# Patient Record
Sex: FEMALE | Race: ASIAN | NOT HISPANIC OR LATINO
[De-identification: names, ages, dates, MRNs, and addresses within clinical notes are randomized per-mention and may not be internally consistent; named-entity substitution may affect disease eponyms.]

---

## 2018-05-01 PROBLEM — Z00.00 ENCOUNTER FOR PREVENTIVE HEALTH EXAMINATION: Status: ACTIVE | Noted: 2018-05-01

## 2018-05-16 ENCOUNTER — APPOINTMENT (OUTPATIENT)
Dept: ANTEPARTUM | Facility: CLINIC | Age: 38
End: 2018-05-16
Payer: MEDICAID

## 2018-05-16 ENCOUNTER — ASOB RESULT (OUTPATIENT)
Age: 38
End: 2018-05-16

## 2018-05-16 PROCEDURE — 36416 COLLJ CAPILLARY BLOOD SPEC: CPT

## 2018-05-16 PROCEDURE — 76813 OB US NUCHAL MEAS 1 GEST: CPT

## 2018-05-16 PROCEDURE — 76801 OB US < 14 WKS SINGLE FETUS: CPT

## 2018-07-05 ENCOUNTER — ASOB RESULT (OUTPATIENT)
Age: 38
End: 2018-07-05

## 2018-07-05 ENCOUNTER — APPOINTMENT (OUTPATIENT)
Dept: ANTEPARTUM | Facility: CLINIC | Age: 38
End: 2018-07-05
Payer: MEDICAID

## 2018-07-05 PROCEDURE — 76811 OB US DETAILED SNGL FETUS: CPT

## 2018-07-05 PROCEDURE — 99201 OFFICE OUTPATIENT NEW 10 MINUTES: CPT | Mod: 25

## 2018-08-28 ENCOUNTER — APPOINTMENT (OUTPATIENT)
Dept: MATERNAL FETAL MEDICINE | Facility: CLINIC | Age: 38
End: 2018-08-28
Payer: MEDICAID

## 2018-08-28 ENCOUNTER — ASOB RESULT (OUTPATIENT)
Age: 38
End: 2018-08-28

## 2018-08-28 DIAGNOSIS — O24.919 UNSPECIFIED DIABETES MELLITUS IN PREGNANCY, UNSPECIFIED TRIMESTER: ICD-10-CM

## 2018-08-28 PROCEDURE — G0109 DIAB MANAGE TRN IND/GROUP: CPT

## 2018-08-29 RX ORDER — BLOOD SUGAR DIAGNOSTIC
STRIP MISCELLANEOUS 4 TIMES DAILY
Qty: 1 | Refills: 4 | Status: ACTIVE | COMMUNITY
Start: 2018-08-28 | End: 1900-01-01

## 2018-08-29 RX ORDER — ALCOHOL ANTISEPTIC PADS
PADS, MEDICATED (EA) TOPICAL
Qty: 1 | Refills: 0 | Status: ACTIVE | COMMUNITY
Start: 2018-08-28 | End: 1900-01-01

## 2018-09-11 ENCOUNTER — APPOINTMENT (OUTPATIENT)
Dept: ANTEPARTUM | Facility: CLINIC | Age: 38
End: 2018-09-11
Payer: MEDICAID

## 2018-09-11 ENCOUNTER — ASOB RESULT (OUTPATIENT)
Age: 38
End: 2018-09-11

## 2018-09-11 ENCOUNTER — APPOINTMENT (OUTPATIENT)
Dept: MATERNAL FETAL MEDICINE | Facility: CLINIC | Age: 38
End: 2018-09-11
Payer: MEDICAID

## 2018-09-11 PROCEDURE — 76816 OB US FOLLOW-UP PER FETUS: CPT

## 2018-09-11 PROCEDURE — G0108 DIAB MANAGE TRN  PER INDIV: CPT

## 2018-09-11 PROCEDURE — 99213 OFFICE O/P EST LOW 20 MIN: CPT | Mod: 25

## 2018-09-11 PROCEDURE — 76819 FETAL BIOPHYS PROFIL W/O NST: CPT

## 2018-10-08 ENCOUNTER — APPOINTMENT (OUTPATIENT)
Dept: MATERNAL FETAL MEDICINE | Facility: CLINIC | Age: 38
End: 2018-10-08

## 2018-10-08 ENCOUNTER — APPOINTMENT (OUTPATIENT)
Dept: ANTEPARTUM | Facility: CLINIC | Age: 38
End: 2018-10-08

## 2019-08-05 ENCOUNTER — EMERGENCY (EMERGENCY)
Facility: HOSPITAL | Age: 39
LOS: 1 days | Discharge: HOME | End: 2019-08-05
Attending: STUDENT IN AN ORGANIZED HEALTH CARE EDUCATION/TRAINING PROGRAM
Payer: COMMERCIAL

## 2019-08-05 VITALS
RESPIRATION RATE: 18 BRPM | SYSTOLIC BLOOD PRESSURE: 113 MMHG | TEMPERATURE: 98 F | DIASTOLIC BLOOD PRESSURE: 76 MMHG | OXYGEN SATURATION: 98 % | WEIGHT: 117.07 LBS | HEIGHT: 63 IN | HEART RATE: 91 BPM

## 2019-08-05 VITALS
HEART RATE: 89 BPM | RESPIRATION RATE: 16 BRPM | DIASTOLIC BLOOD PRESSURE: 64 MMHG | SYSTOLIC BLOOD PRESSURE: 103 MMHG | OXYGEN SATURATION: 98 % | TEMPERATURE: 98 F

## 2019-08-05 LAB
APPEARANCE UR: CLEAR — SIGNIFICANT CHANGE UP
BILIRUB UR-MCNC: NEGATIVE — SIGNIFICANT CHANGE UP
COLOR SPEC: COLORLESS — SIGNIFICANT CHANGE UP
DIFF PNL FLD: NEGATIVE — SIGNIFICANT CHANGE UP
GLUCOSE UR QL: NEGATIVE — SIGNIFICANT CHANGE UP
HCG UR QL: POSITIVE
KETONES UR-MCNC: NEGATIVE — SIGNIFICANT CHANGE UP
LEUKOCYTE ESTERASE UR-ACNC: NEGATIVE — SIGNIFICANT CHANGE UP
NITRITE UR-MCNC: NEGATIVE — SIGNIFICANT CHANGE UP
PH UR: 7 — SIGNIFICANT CHANGE UP (ref 5–8)
PROT UR-MCNC: NEGATIVE — SIGNIFICANT CHANGE UP
SP GR SPEC: 1.01 — SIGNIFICANT CHANGE UP (ref 1.01–1.02)
UROBILINOGEN FLD QL: NEGATIVE — SIGNIFICANT CHANGE UP

## 2019-08-05 PROCEDURE — 81025 URINE PREGNANCY TEST: CPT

## 2019-08-05 PROCEDURE — 99284 EMERGENCY DEPT VISIT MOD MDM: CPT

## 2019-08-05 PROCEDURE — 93975 VASCULAR STUDY: CPT

## 2019-08-05 PROCEDURE — 99284 EMERGENCY DEPT VISIT MOD MDM: CPT | Mod: 25

## 2019-08-05 PROCEDURE — 93975 VASCULAR STUDY: CPT | Mod: 26

## 2019-08-05 PROCEDURE — 76817 TRANSVAGINAL US OBSTETRIC: CPT | Mod: 26

## 2019-08-05 PROCEDURE — 81003 URINALYSIS AUTO W/O SCOPE: CPT

## 2019-08-05 PROCEDURE — 76817 TRANSVAGINAL US OBSTETRIC: CPT

## 2019-08-05 NOTE — ED PROVIDER NOTE - PROGRESS NOTE DETAILS
US results obtained and reviewed which show a single viable intrauterine pregnancy. Estimated gestational age of 8 weeks 1 day. Small subchorionic hematoma. UA reviewed which was unremarkable. Patient made aware of results. pt continues to deny vaginal bleeding, abdominal pain, vomiting

## 2019-08-05 NOTE — ED PROVIDER NOTE - CLINICAL SUMMARY MEDICAL DECISION MAKING FREE TEXT BOX
Patient presenting with concern for no pregnancy--had US that did not show IUP. Beside ultrasound here at ED shows intrauterine pregnancy with  bpm. Patient requesting formal TVUS at this time. Patient presenting with concern for no pregnancy--had US that did not show IUP. Beside ultrasound here at ED shows  bpm. Patient requesting formal TVUS at this time. Patient presenting with concern for no pregnancy--had US that did not show IUP. Beside ultrasound here at ED shows  bpm. Patient requesting formal TVUS at this time. Will order this, urine preg, and UA. Patient presenting with concern for no pregnancy (possible ectopic vs IUP)--had US in [place] that did not show IUP. Beside ultrasound here at ED shows  bpm. (however unable to measure fetus size.  Patient requesting formal TVUS at this time. Will order this, urine preg, and UA.

## 2019-08-05 NOTE — ED ADULT TRIAGE NOTE - CHIEF COMPLAINT QUOTE
"I am 7 weeks pregnant and I want to get check. " As per pt she was seen in [place] and was told she had no fetal pole. Denies abd pain , vaginal bleeding or discharge.

## 2019-08-05 NOTE — ED PROVIDER NOTE - OBJECTIVE STATEMENT
38  p/w concern for no pregnancy. pt states that she has no fevers, chills, abdominal pain,   she reports that she has no chest pain or shortness of breath, reports that her beta hcg was 1500 when she was 37 days pregnant. reports that when she was 42 days pregnant she had an ultrasound that didn't reveal an iup.  Pt has a positive pregnancy test.   p/w 37  p/w concern for no pregnancy. pt states that she has no fevers, chills, abdominal pain, vaginal bleeding  she reports that she has no chest pain or shortness of breath, reports that her beta hcg was 1500 when she was 37 days pregnant. reports that when she was 42 days pregnant she had an ultrasound that didn't reveal an iup.  Pt has a positive pregnancy test.

## 2019-08-05 NOTE — ED ADULT NURSE NOTE - OBJECTIVE STATEMENT
Patient states she is about 7 weeks pregnant. , presenting with a concern for unviable pregnancy. Patient was imaged in [place] and told that there was "no heartbeat and no fetal pole". Patient requesting ultrasound for further evaluation. Patient denies vaginal bleeding, and denies pain, but does have some mild cramping. Last menstrual period was 2019. Patients toddler at bedside and asleep in stroller. Patient oriented to callbell.

## 2019-08-05 NOTE — ED ADULT NURSE NOTE - CHIEF COMPLAINT QUOTE
"I am 7 weeks pregnant and I want to get check. " As per pt she was seen in [place] and was told she had no fetal pole. Denies abd pain , vaginal bleeding or discharge.

## 2019-08-05 NOTE — ED PROVIDER NOTE - NSFOLLOWUPINSTRUCTIONS_ED_ALL_ED_FT
-Follow-up with your Ob-Gyn doctor in the next 4-6 days  -Return for any new/concerning symptoms including severe headache, vision changes, swelling of hands or face, pain or cramping in abdomen or back, vaginal bleeding -Follow-up with your Ob-Gyn doctor in the next 4-6 days  -Return for any new/concerning symptoms including severe headache, vision changes, swelling of hands or face, pain or cramping in abdomen or back, vaginal bleeding    We saw that you have a pregnancy that is live in your uterus with a small hematoma. Please make sure to show your obgyn the results of this. You had no vaginal bleeding today. You had no abdominal pain today.

## 2019-08-05 NOTE — ED PROVIDER NOTE - PHYSICAL EXAMINATION
I have reviewed the triage vital signs.    Const: Well-nourished, Well-developed, appearing stated age  Eyes: no conjunctival injection and no scleral icterus  ENMT: Atraumatic external nose and ears, Moist mucus membranes  Neck: Symmetric, trachea midline,  CVS: +S1/S2, radial pulse 2+ bilaterally  RESP: Unlabored respiratory effort, Clear to auscultation bilaterally  GI: Nontender/Nondistended soft abdomen  MSK: Normocephalic/Atraumatic, Extremities w/o deformity or ttp   Skin: Warm, Dry w/ no rashes  Neuro: motor in all 4 extremities equal, Sensation grossly intact   Psych: Awake, Alert, & Orientedx3;  Appropriate mood and affect, cooperative

## 2019-08-05 NOTE — ED PROVIDER NOTE - NSFOLLOWUPCLINICS_GEN_ALL_ED_FT
[facility]  [address] [place] [address]  Phone: [phone]  Fax: [phone]  Follow Up Time: 4-6 Days

## 2019-09-09 ENCOUNTER — ASOB RESULT (OUTPATIENT)
Age: 39
End: 2019-09-09

## 2019-09-09 ENCOUNTER — APPOINTMENT (OUTPATIENT)
Dept: ANTEPARTUM | Facility: CLINIC | Age: 39
End: 2019-09-09
Payer: COMMERCIAL

## 2019-09-09 PROCEDURE — 36416 COLLJ CAPILLARY BLOOD SPEC: CPT

## 2019-09-09 PROCEDURE — 76813 OB US NUCHAL MEAS 1 GEST: CPT

## 2019-09-09 PROCEDURE — 76801 OB US < 14 WKS SINGLE FETUS: CPT

## 2019-10-28 ENCOUNTER — APPOINTMENT (OUTPATIENT)
Dept: ANTEPARTUM | Facility: CLINIC | Age: 39
End: 2019-10-28
Payer: COMMERCIAL

## 2019-10-28 ENCOUNTER — ASOB RESULT (OUTPATIENT)
Age: 39
End: 2019-10-28

## 2019-10-28 PROCEDURE — 76811 OB US DETAILED SNGL FETUS: CPT

## 2020-01-30 ENCOUNTER — APPOINTMENT (OUTPATIENT)
Dept: MATERNAL FETAL MEDICINE | Facility: CLINIC | Age: 40
End: 2020-01-30
Payer: MEDICAID

## 2020-01-30 ENCOUNTER — ASOB RESULT (OUTPATIENT)
Age: 40
End: 2020-01-30

## 2020-01-30 PROCEDURE — G0108 DIAB MANAGE TRN  PER INDIV: CPT

## 2020-02-10 ENCOUNTER — APPOINTMENT (OUTPATIENT)
Dept: ANTEPARTUM | Facility: CLINIC | Age: 40
End: 2020-02-10
Payer: COMMERCIAL

## 2020-02-10 ENCOUNTER — APPOINTMENT (OUTPATIENT)
Dept: MATERNAL FETAL MEDICINE | Facility: CLINIC | Age: 40
End: 2020-02-10

## 2020-02-10 ENCOUNTER — ASOB RESULT (OUTPATIENT)
Age: 40
End: 2020-02-10

## 2020-02-10 PROCEDURE — 76819 FETAL BIOPHYS PROFIL W/O NST: CPT

## 2020-02-10 PROCEDURE — 76816 OB US FOLLOW-UP PER FETUS: CPT

## 2020-02-12 ENCOUNTER — ASOB RESULT (OUTPATIENT)
Age: 40
End: 2020-02-12

## 2020-02-12 ENCOUNTER — APPOINTMENT (OUTPATIENT)
Dept: MATERNAL FETAL MEDICINE | Facility: CLINIC | Age: 40
End: 2020-02-12
Payer: COMMERCIAL

## 2020-02-12 PROCEDURE — G0108 DIAB MANAGE TRN  PER INDIV: CPT

## 2020-02-12 RX ORDER — INSULIN LISPRO 100 [IU]/ML
100 INJECTION, SOLUTION INTRAVENOUS; SUBCUTANEOUS
Qty: 1 | Refills: 0 | Status: ACTIVE | COMMUNITY
Start: 2020-02-12 | End: 1900-01-01

## 2020-02-12 RX ORDER — PEN NEEDLE, DIABETIC 29 G X1/2"
32G X 4 MM NEEDLE, DISPOSABLE MISCELLANEOUS
Qty: 1 | Refills: 1 | Status: ACTIVE | COMMUNITY
Start: 2020-02-12 | End: 1900-01-01

## 2020-03-02 ENCOUNTER — APPOINTMENT (OUTPATIENT)
Dept: ANTEPARTUM | Facility: CLINIC | Age: 40
End: 2020-03-02
Payer: COMMERCIAL

## 2020-03-02 ENCOUNTER — ASOB RESULT (OUTPATIENT)
Age: 40
End: 2020-03-02

## 2020-03-02 ENCOUNTER — APPOINTMENT (OUTPATIENT)
Dept: MATERNAL FETAL MEDICINE | Facility: CLINIC | Age: 40
End: 2020-03-02
Payer: COMMERCIAL

## 2020-03-02 PROCEDURE — 76816 OB US FOLLOW-UP PER FETUS: CPT

## 2020-03-02 PROCEDURE — G0108 DIAB MANAGE TRN  PER INDIV: CPT

## 2020-03-02 PROCEDURE — 76818 FETAL BIOPHYS PROFILE W/NST: CPT | Mod: 26

## 2020-03-08 ENCOUNTER — TRANSCRIPTION ENCOUNTER (OUTPATIENT)
Age: 40
End: 2020-03-08

## 2020-03-08 ENCOUNTER — INPATIENT (INPATIENT)
Facility: HOSPITAL | Age: 40
LOS: 1 days | Discharge: HOME | End: 2020-03-10
Attending: OBSTETRICS & GYNECOLOGY | Admitting: OBSTETRICS & GYNECOLOGY

## 2020-03-08 VITALS
DIASTOLIC BLOOD PRESSURE: 66 MMHG | RESPIRATION RATE: 16 BRPM | SYSTOLIC BLOOD PRESSURE: 117 MMHG | OXYGEN SATURATION: 100 % | TEMPERATURE: 98 F | HEART RATE: 92 BPM

## 2020-03-08 DIAGNOSIS — Z3A.00 WEEKS OF GESTATION OF PREGNANCY NOT SPECIFIED: ICD-10-CM

## 2020-03-08 DIAGNOSIS — O42.90 PREMATURE RUPTURE OF MEMBRANES, UNSPECIFIED AS TO LENGTH OF TIME BETWEEN RUPTURE AND ONSET OF LABOR, UNSPECIFIED WEEKS OF GESTATION: ICD-10-CM

## 2020-03-08 DIAGNOSIS — O26.899 OTHER SPECIFIED PREGNANCY RELATED CONDITIONS, UNSPECIFIED TRIMESTER: ICD-10-CM

## 2020-03-08 LAB
BASOPHILS # BLD AUTO: 0.02 K/UL — SIGNIFICANT CHANGE UP (ref 0–0.2)
BASOPHILS NFR BLD AUTO: 0.2 % — SIGNIFICANT CHANGE UP (ref 0–2)
BLD GP AB SCN SERPL QL: NEGATIVE — SIGNIFICANT CHANGE UP
EOSINOPHIL # BLD AUTO: 0.05 K/UL — SIGNIFICANT CHANGE UP (ref 0–0.5)
EOSINOPHIL NFR BLD AUTO: 0.6 % — SIGNIFICANT CHANGE UP (ref 0–6)
GLUCOSE BLDC GLUCOMTR-MCNC: 84 MG/DL — SIGNIFICANT CHANGE UP (ref 70–99)
GLUCOSE BLDC GLUCOMTR-MCNC: 88 MG/DL — SIGNIFICANT CHANGE UP (ref 70–99)
GLUCOSE BLDC GLUCOMTR-MCNC: 92 MG/DL — SIGNIFICANT CHANGE UP (ref 70–99)
HCT VFR BLD CALC: 36.4 % — SIGNIFICANT CHANGE UP (ref 34.5–45)
HGB BLD-MCNC: 12.1 G/DL — SIGNIFICANT CHANGE UP (ref 11.5–15.5)
IMM GRANULOCYTES NFR BLD AUTO: 1.3 % — SIGNIFICANT CHANGE UP (ref 0–1.5)
LYMPHOCYTES # BLD AUTO: 1.49 K/UL — SIGNIFICANT CHANGE UP (ref 1–3.3)
LYMPHOCYTES # BLD AUTO: 18.1 % — SIGNIFICANT CHANGE UP (ref 13–44)
MCHC RBC-ENTMCNC: 33.2 % — SIGNIFICANT CHANGE UP (ref 32–36)
MCHC RBC-ENTMCNC: 33.3 PG — SIGNIFICANT CHANGE UP (ref 27–34)
MCV RBC AUTO: 100.3 FL — HIGH (ref 80–100)
MONOCYTES # BLD AUTO: 0.59 K/UL — SIGNIFICANT CHANGE UP (ref 0–0.9)
MONOCYTES NFR BLD AUTO: 7.2 % — SIGNIFICANT CHANGE UP (ref 2–14)
NEUTROPHILS # BLD AUTO: 5.98 K/UL — SIGNIFICANT CHANGE UP (ref 1.8–7.4)
NEUTROPHILS NFR BLD AUTO: 72.6 % — SIGNIFICANT CHANGE UP (ref 43–77)
NRBC # FLD: 0 K/UL — SIGNIFICANT CHANGE UP (ref 0–0)
PLATELET # BLD AUTO: 146 K/UL — LOW (ref 150–400)
PMV BLD: 12.1 FL — SIGNIFICANT CHANGE UP (ref 7–13)
RBC # BLD: 3.63 M/UL — LOW (ref 3.8–5.2)
RBC # FLD: 12.7 % — SIGNIFICANT CHANGE UP (ref 10.3–14.5)
RH IG SCN BLD-IMP: POSITIVE — SIGNIFICANT CHANGE UP
RH IG SCN BLD-IMP: POSITIVE — SIGNIFICANT CHANGE UP
T PALLIDUM AB TITR SER: NEGATIVE — SIGNIFICANT CHANGE UP
WBC # BLD: 8.24 K/UL — SIGNIFICANT CHANGE UP (ref 3.8–10.5)
WBC # FLD AUTO: 8.24 K/UL — SIGNIFICANT CHANGE UP (ref 3.8–10.5)

## 2020-03-08 RX ORDER — TETANUS TOXOID, REDUCED DIPHTHERIA TOXOID AND ACELLULAR PERTUSSIS VACCINE, ADSORBED 5; 2.5; 8; 8; 2.5 [IU]/.5ML; [IU]/.5ML; UG/.5ML; UG/.5ML; UG/.5ML
0.5 SUSPENSION INTRAMUSCULAR ONCE
Refills: 0 | Status: DISCONTINUED | OUTPATIENT
Start: 2020-03-08 | End: 2020-03-10

## 2020-03-08 RX ORDER — SODIUM CHLORIDE 9 MG/ML
3 INJECTION INTRAMUSCULAR; INTRAVENOUS; SUBCUTANEOUS EVERY 8 HOURS
Refills: 0 | Status: DISCONTINUED | OUTPATIENT
Start: 2020-03-08 | End: 2020-03-10

## 2020-03-08 RX ORDER — PRAMOXINE HYDROCHLORIDE 150 MG/15G
1 AEROSOL, FOAM RECTAL EVERY 4 HOURS
Refills: 0 | Status: DISCONTINUED | OUTPATIENT
Start: 2020-03-08 | End: 2020-03-10

## 2020-03-08 RX ORDER — ACETAMINOPHEN 500 MG
975 TABLET ORAL
Refills: 0 | Status: DISCONTINUED | OUTPATIENT
Start: 2020-03-08 | End: 2020-03-10

## 2020-03-08 RX ORDER — DIBUCAINE 1 %
1 OINTMENT (GRAM) RECTAL EVERY 6 HOURS
Refills: 0 | Status: DISCONTINUED | OUTPATIENT
Start: 2020-03-08 | End: 2020-03-10

## 2020-03-08 RX ORDER — KETOROLAC TROMETHAMINE 30 MG/ML
30 SYRINGE (ML) INJECTION ONCE
Refills: 0 | Status: DISCONTINUED | OUTPATIENT
Start: 2020-03-08 | End: 2020-03-08

## 2020-03-08 RX ORDER — CITRIC ACID/SODIUM CITRATE 300-500 MG
15 SOLUTION, ORAL ORAL EVERY 6 HOURS
Refills: 0 | Status: DISCONTINUED | OUTPATIENT
Start: 2020-03-08 | End: 2020-03-08

## 2020-03-08 RX ORDER — OXYTOCIN 10 UNIT/ML
333.33 VIAL (ML) INJECTION
Qty: 20 | Refills: 0 | Status: DISCONTINUED | OUTPATIENT
Start: 2020-03-08 | End: 2020-03-08

## 2020-03-08 RX ORDER — SODIUM CHLORIDE 9 MG/ML
1000 INJECTION, SOLUTION INTRAVENOUS
Refills: 0 | Status: DISCONTINUED | OUTPATIENT
Start: 2020-03-08 | End: 2020-03-08

## 2020-03-08 RX ORDER — SIMETHICONE 80 MG/1
80 TABLET, CHEWABLE ORAL EVERY 4 HOURS
Refills: 0 | Status: DISCONTINUED | OUTPATIENT
Start: 2020-03-08 | End: 2020-03-10

## 2020-03-08 RX ORDER — AER TRAVELER 0.5 G/1
1 SOLUTION RECTAL; TOPICAL EVERY 4 HOURS
Refills: 0 | Status: DISCONTINUED | OUTPATIENT
Start: 2020-03-08 | End: 2020-03-10

## 2020-03-08 RX ORDER — DIPHENHYDRAMINE HCL 50 MG
25 CAPSULE ORAL EVERY 6 HOURS
Refills: 0 | Status: DISCONTINUED | OUTPATIENT
Start: 2020-03-08 | End: 2020-03-10

## 2020-03-08 RX ORDER — OXYCODONE HYDROCHLORIDE 5 MG/1
5 TABLET ORAL
Refills: 0 | Status: DISCONTINUED | OUTPATIENT
Start: 2020-03-08 | End: 2020-03-10

## 2020-03-08 RX ORDER — BENZOCAINE 10 %
1 GEL (GRAM) MUCOUS MEMBRANE EVERY 6 HOURS
Refills: 0 | Status: DISCONTINUED | OUTPATIENT
Start: 2020-03-08 | End: 2020-03-10

## 2020-03-08 RX ORDER — IBUPROFEN 200 MG
600 TABLET ORAL EVERY 6 HOURS
Refills: 0 | Status: DISCONTINUED | OUTPATIENT
Start: 2020-03-08 | End: 2020-03-10

## 2020-03-08 RX ORDER — SODIUM CHLORIDE 9 MG/ML
1000 INJECTION, SOLUTION INTRAVENOUS ONCE
Refills: 0 | Status: COMPLETED | OUTPATIENT
Start: 2020-03-08 | End: 2020-03-08

## 2020-03-08 RX ORDER — MAGNESIUM HYDROXIDE 400 MG/1
30 TABLET, CHEWABLE ORAL
Refills: 0 | Status: DISCONTINUED | OUTPATIENT
Start: 2020-03-08 | End: 2020-03-10

## 2020-03-08 RX ORDER — OXYTOCIN 10 UNIT/ML
2 VIAL (ML) INJECTION
Qty: 30 | Refills: 0 | Status: DISCONTINUED | OUTPATIENT
Start: 2020-03-08 | End: 2020-03-08

## 2020-03-08 RX ORDER — HYDROCORTISONE 1 %
1 OINTMENT (GRAM) TOPICAL EVERY 6 HOURS
Refills: 0 | Status: DISCONTINUED | OUTPATIENT
Start: 2020-03-08 | End: 2020-03-10

## 2020-03-08 RX ORDER — OXYCODONE HYDROCHLORIDE 5 MG/1
5 TABLET ORAL ONCE
Refills: 0 | Status: DISCONTINUED | OUTPATIENT
Start: 2020-03-08 | End: 2020-03-10

## 2020-03-08 RX ORDER — GLYCERIN ADULT
1 SUPPOSITORY, RECTAL RECTAL AT BEDTIME
Refills: 0 | Status: DISCONTINUED | OUTPATIENT
Start: 2020-03-08 | End: 2020-03-10

## 2020-03-08 RX ORDER — IBUPROFEN 200 MG
600 TABLET ORAL EVERY 6 HOURS
Refills: 0 | Status: COMPLETED | OUTPATIENT
Start: 2020-03-08 | End: 2021-02-04

## 2020-03-08 RX ORDER — LANOLIN
1 OINTMENT (GRAM) TOPICAL EVERY 6 HOURS
Refills: 0 | Status: DISCONTINUED | OUTPATIENT
Start: 2020-03-08 | End: 2020-03-10

## 2020-03-08 RX ADMIN — SODIUM CHLORIDE 125 MILLILITER(S): 9 INJECTION, SOLUTION INTRAVENOUS at 06:48

## 2020-03-08 RX ADMIN — Medication 975 MILLIGRAM(S): at 14:30

## 2020-03-08 RX ADMIN — Medication 600 MILLIGRAM(S): at 17:41

## 2020-03-08 RX ADMIN — SODIUM CHLORIDE 1000 MILLILITER(S): 9 INJECTION, SOLUTION INTRAVENOUS at 06:15

## 2020-03-08 RX ADMIN — Medication 2 MILLIUNIT(S)/MIN: at 08:06

## 2020-03-08 RX ADMIN — SODIUM CHLORIDE 3 MILLILITER(S): 9 INJECTION INTRAMUSCULAR; INTRAVENOUS; SUBCUTANEOUS at 14:30

## 2020-03-08 RX ADMIN — Medication 975 MILLIGRAM(S): at 15:00

## 2020-03-08 RX ADMIN — Medication 600 MILLIGRAM(S): at 18:10

## 2020-03-08 NOTE — DISCHARGE NOTE OB - PATIENT PORTAL LINK FT
You can access the FollowMyHealth Patient Portal offered by [facility] by registering at the following website: [email or web address]. By joining [employer]’s FollowMyHealth portal, you will also be able to view your health information using other applications (apps) compatible with our system.

## 2020-03-08 NOTE — OB RN PATIENT PROFILE - ALERT: PERTINENT HISTORY
1st Trimester Sonogram/Fetal Non-Stress Test (NST)/Ultra Screen at 12 Weeks/20 Week Level II Sonogram

## 2020-03-08 NOTE — OB PROVIDER H&P - HISTORY OF PRESENT ILLNESS
38 yo  female.  38 5/7 weeks with complaints of rom since 0430 and contractions since 5 am. Denies vaginal bleeding.    Current a/p complications: GDMA2 on NPH 5 Units qhs and humalog 10 units at lunch and dinner     Allergies: NKDA  Medications: PNV, ASA, NPH 5 Units qhs and humalog 10 units at lunch and dinner  PMH: gdma2   PSH: d&c x1   OB/GYN: 2008 FT  uncomplicated 7#  2018 FT  GDM 7#6  TOPx 1 w/ d&c  SAB x1 complete   Social: Denies   Psychosocial: Denies

## 2020-03-08 NOTE — OB PROVIDER TRIAGE NOTE - HISTORY OF PRESENT ILLNESS
40 yo  female.  38 5/7 weeks with complaints of rom since 0430 and contractions since 5 am. Denies vaginal bleeding.    Current a/p complications: GDMA2 on NPH 5 Units qhs and humalog 10 units at lunch and dinner     Allergies: NKDA  Medications: PNV, ASA, NPH 5 Units qhs and humalog 10 units at lunch and dinner  PMH: gdma2   PSH: d&c x1   OB/GYN: 2008 FT  uncomplicated 7#  2018 FT  GDM 7#6  TOPx 1 w/ d&c  SAB x1 complete   Social: Denies   Psychosocial: Denies

## 2020-03-08 NOTE — DISCHARGE NOTE OB - MEDICATION SUMMARY - MEDICATIONS TO STOP TAKING
I will STOP taking the medications listed below when I get home from the hospital:    NPH  -- 5 unit(s)  once a day (at bedtime)    HumaLOG  -- 10 unit(s) subcutaneous 2 times a day before lunch and dinner    baby aspirin

## 2020-03-08 NOTE — OB RN TRIAGE NOTE - PMH
Normal vaginal delivery  8/15/2008 wt 7#  11/24/2018 wt 7#6 GDM Normal vaginal delivery  8/15/2008 wt 7#  2018 wt 7#6 GDM  Spontaneous     Termination of pregnancy (fetus)

## 2020-03-08 NOTE — DISCHARGE NOTE OB - CARE PROVIDER_API CALL
Tierra Finch)  Obstetrics and Gynecology  [address] [place] [address]  Phone: [phone]  Fax: [phone]  Follow Up Time:

## 2020-03-08 NOTE — OB RN DELIVERY SUMMARY - NS_SEPSISRSKCALC_OBGYN_ALL_OB_FT
EOS calculated successfully. EOS Risk Factor: 0.5/1000 live births ([facility] national incidence); GA=38w5d; Temp=97.7; ROM=5.317; GBS='Negative'; Antibiotics='No antibiotics or any antibiotics < 2 hrs prior to birth'

## 2020-03-08 NOTE — OB PROVIDER IHI INDUCTION/AUGMENTATION NOTE - NS_CHECKALL_OBGYN_ALL_OB
H&P was completed/FHR was reviewed/Order was written/Induction / Augmentation was discussed/Contractions pattern was reviewed

## 2020-03-08 NOTE — OB PROVIDER TRIAGE NOTE - NSHPPHYSICALEXAM_GEN_ALL_CORE
Vital Signs Last 24 Hrs  T(C): 36.5 (08 Mar 2020 05:30), Max: 36.5 (08 Mar 2020 05:30)  T(F): 97.7 (08 Mar 2020 05:30), Max: 97.7 (08 Mar 2020 05:30)  HR: 88 (08 Mar 2020 05:49) (88 - 92)  BP: 114/71 (08 Mar 2020 05:49) (114/71 - 117/66)  RR: 16 (08 Mar 2020 05:30) (16 - 16)  SpO2: 100% (08 Mar 2020 05:30) (100% - 100%)    Abdomen soft, nontender   lungs clear bilaterally   heart: r/r/r    grossly ruptured    SVE 4/80/-3    Category 1   irregular contractions by toco     EFW 3400 g

## 2020-03-08 NOTE — OB RN PATIENT PROFILE - AS SC BRADEN FRICTION
Pt states MRI was not performed. CX[facility] was performed. [facility].   Pt states knee swelling has improved, still experiencing some pain.  Pt will follow up regarding MRI clearance.    (3) no apparent problem

## 2020-03-08 NOTE — OB PROVIDER TRIAGE NOTE - NSOBPROVIDERNOTE_OBGYN_ALL_OB_FT
Evidence of rupture of membranes     d/w Dr. Kapadia   -Admit to L&D for expectant management   -Routine orders placed  -approved for epidural

## 2020-03-08 NOTE — DISCHARGE NOTE OB - CARE PLAN
Principal Discharge DX:	Normal vaginal delivery  Goal:	recovery  Assessment and plan of treatment:	recovery

## 2020-03-09 ENCOUNTER — APPOINTMENT (OUTPATIENT)
Dept: ANTEPARTUM | Facility: HOSPITAL | Age: 40
End: 2020-03-09

## 2020-03-09 ENCOUNTER — APPOINTMENT (OUTPATIENT)
Dept: ANTEPARTUM | Facility: CLINIC | Age: 40
End: 2020-03-09

## 2020-03-09 DIAGNOSIS — O24.419 GESTATIONAL DIABETES MELLITUS IN PREGNANCY, UNSPECIFIED CONTROL: ICD-10-CM

## 2020-03-09 LAB — GLUCOSE BLDC GLUCOMTR-MCNC: 72 MG/DL — SIGNIFICANT CHANGE UP (ref 70–99)

## 2020-03-09 RX ORDER — INSULIN LISPRO 100/ML
10 VIAL (ML) SUBCUTANEOUS
Qty: 0 | Refills: 0 | DISCHARGE

## 2020-03-09 RX ORDER — OMEGA-3 ACID ETHYL ESTERS 1 G
0 CAPSULE ORAL
Qty: 0 | Refills: 0 | DISCHARGE

## 2020-03-09 RX ADMIN — Medication 1 TABLET(S): at 13:06

## 2020-03-09 RX ADMIN — Medication 975 MILLIGRAM(S): at 11:37

## 2020-03-09 RX ADMIN — SODIUM CHLORIDE 3 MILLILITER(S): 9 INJECTION INTRAMUSCULAR; INTRAVENOUS; SUBCUTANEOUS at 00:00

## 2020-03-09 RX ADMIN — SODIUM CHLORIDE 3 MILLILITER(S): 9 INJECTION INTRAMUSCULAR; INTRAVENOUS; SUBCUTANEOUS at 05:38

## 2020-03-09 RX ADMIN — Medication 600 MILLIGRAM(S): at 03:35

## 2020-03-09 RX ADMIN — Medication 600 MILLIGRAM(S): at 04:10

## 2020-03-09 RX ADMIN — Medication 975 MILLIGRAM(S): at 11:00

## 2020-03-09 NOTE — LACTATION INITIAL EVALUATION - NS LACT CON REASON FOR REQ
multiparous mom/Discussed breastfeeding strategies, assistance offered prn./general questions without assessment

## 2020-03-09 NOTE — PROGRESS NOTE ADULT - SUBJECTIVE AND OBJECTIVE BOX
Post-partum Note,   She is a  39y woman who is now post-partum day: 1    Subjective:  The patient feels well.  She is ambulating.   She is tolerating regular diet.  She denies nausea and vomiting; denies fever.  She is voiding.  Her pain is controlled.  She reports normal postpartum bleeding.  She is breastfeeding.  She is formula feeding.    Physical exam:    Vital Signs Last 24 Hrs  T(C): 36.6 (09 Mar 2020 05:33), Max: 36.9 (08 Mar 2020 18:48)  T(F): 97.8 (09 Mar 2020 05:33), Max: 98.5 (08 Mar 2020 18:48)  HR: 67 (09 Mar 2020 05:33) (67 - 181)  BP: 102/53 (09 Mar 2020 05:33) (86/52 - 130/70)  BP(mean): --  RR: 17 (09 Mar 2020 05:33) (16 - 18)  SpO2: 99% (09 Mar 2020 05:33) (87% - 100%)    Gen: NAD  Breast: Soft, nontender, not engorged.  Abdomen: Soft, nontender, no distension , firm uterine fundus at umbilicus.  Pelvic: Normal lochia noted  Ext: No calf tenderness    LABS:                        12.1   8.24  )-----------( 146      ( 08 Mar 2020 06:15 )             36.4       Rubella status:     Allergies    No Known Allergies    Intolerances      MEDICATIONS  (STANDING):  acetaminophen   Tablet .. 975 milliGRAM(s) Oral <User Schedule>  diphtheria/tetanus/pertussis (acellular) Vaccine (ADAcel) 0.5 milliLiter(s) IntraMuscular once  ibuprofen  Tablet. 600 milliGRAM(s) Oral every 6 hours  prenatal multivitamin 1 Tablet(s) Oral daily  sodium chloride 0.9% lock flush 3 milliLiter(s) IV Push every 8 hours    MEDICATIONS  (PRN):  benzocaine 20%/menthol 0.5% Spray 1 Spray(s) Topical every 6 hours PRN for Perineal discomfort  dibucaine 1% Ointment 1 Application(s) Topical every 6 hours PRN Perineal discomfort  diphenhydrAMINE 25 milliGRAM(s) Oral every 6 hours PRN Pruritus  glycerin Suppository - Adult 1 Suppository(s) Rectal at bedtime PRN Constipation  hydrocortisone 1% Cream 1 Application(s) Topical every 6 hours PRN Moderate Pain (4-6)  lanolin Ointment 1 Application(s) Topical every 6 hours PRN nipple soreness  magnesium hydroxide Suspension 30 milliLiter(s) Oral two times a day PRN Constipation  oxyCODONE    IR 5 milliGRAM(s) Oral every 3 hours PRN Moderate to Severe Pain (4-10)  oxyCODONE    IR 5 milliGRAM(s) Oral once PRN Moderate to Severe Pain (4-10)  pramoxine 1%/zinc 5% Cream 1 Application(s) Topical every 4 hours PRN Moderate Pain (4-6)  simethicone 80 milliGRAM(s) Chew every 4 hours PRN Gas  witch hazel Pads 1 Application(s) Topical every 4 hours PRN Perineal discomfort        Assessment and Plan  PPD #1 s/p   Doing well.  Encourage ambulation.  PP & PPD Instructions reviewed.  CPC.  D/C home tomorrow.

## 2020-03-09 NOTE — PROGRESS NOTE ADULT - SUBJECTIVE AND OBJECTIVE BOX
Patient seen at bedside resting comfortably offers no current complaints.  Ambulating and voiding without difficulty.  Passing flatus and tolerating regular diet.  both breast/bottle feeding. Bonding well with  Denies HA, CP, SOB, N/V/D, dizziness, palpitations, worsening abdominal pain, worsening vaginal bleeding, or any other concerns.      Vital Signs Last 24 Hrs  T(C): 36.6 (09 Mar 2020 05:33), Max: 36.9 (08 Mar 2020 18:48)  T(F): 97.8 (09 Mar 2020 05:33), Max: 98.5 (08 Mar 2020 18:48)  HR: 67 (09 Mar 2020 05:33) (67 - 181)  BP: 102/53 (09 Mar 2020 05:33) (96/60 - 118/57)  BP(mean): --  RR: 17 (09 Mar 2020 05:33) (16 - 18)  SpO2: 99% (09 Mar 2020 05:33) (94% - 100%)                              12.1   8.24  )-----------( 146      ( 08 Mar 2020 06:15 )             36.4        A/P:  PPD#2 s/p       - Discharge home with instructions  -Follow up in office in 5-6 weeks for postpartum visit  - encouraged healthy eating habits  - rpt glucose testing 6wks PP  -Breastfeeding encouraged   -d/w dr Bryant

## 2020-03-10 VITALS
TEMPERATURE: 98 F | DIASTOLIC BLOOD PRESSURE: 60 MMHG | RESPIRATION RATE: 16 BRPM | SYSTOLIC BLOOD PRESSURE: 96 MMHG | HEART RATE: 73 BPM | OXYGEN SATURATION: 96 %

## 2021-03-19 ENCOUNTER — ASOB RESULT (OUTPATIENT)
Age: 41
End: 2021-03-19

## 2021-03-19 ENCOUNTER — APPOINTMENT (OUTPATIENT)
Dept: ANTEPARTUM | Facility: CLINIC | Age: 41
End: 2021-03-19
Payer: COMMERCIAL

## 2021-03-19 PROCEDURE — 36416 COLLJ CAPILLARY BLOOD SPEC: CPT

## 2021-03-19 PROCEDURE — 99072 ADDL SUPL MATRL&STAF TM PHE: CPT

## 2021-03-19 PROCEDURE — 76801 OB US < 14 WKS SINGLE FETUS: CPT

## 2021-03-19 PROCEDURE — 76813 OB US NUCHAL MEAS 1 GEST: CPT

## 2021-04-05 ENCOUNTER — APPOINTMENT (OUTPATIENT)
Dept: ANTEPARTUM | Facility: CLINIC | Age: 41
End: 2021-04-05
Payer: COMMERCIAL

## 2021-04-05 ENCOUNTER — ASOB RESULT (OUTPATIENT)
Age: 41
End: 2021-04-05

## 2021-04-05 PROCEDURE — 99072 ADDL SUPL MATRL&STAF TM PHE: CPT

## 2021-04-05 PROCEDURE — 99241 OFFICE CONSULTATION NEW/ESTAB PATIENT 15 MIN: CPT | Mod: 25

## 2021-04-05 PROCEDURE — 76815 OB US LIMITED FETUS(S): CPT

## 2021-05-07 ENCOUNTER — APPOINTMENT (OUTPATIENT)
Dept: ANTEPARTUM | Facility: CLINIC | Age: 41
End: 2021-05-07
Payer: COMMERCIAL

## 2021-05-07 ENCOUNTER — ASOB RESULT (OUTPATIENT)
Age: 41
End: 2021-05-07

## 2021-05-07 PROCEDURE — 99072 ADDL SUPL MATRL&STAF TM PHE: CPT

## 2021-05-07 PROCEDURE — 76811 OB US DETAILED SNGL FETUS: CPT

## 2021-05-07 PROCEDURE — 99212 OFFICE O/P EST SF 10 MIN: CPT | Mod: 25

## 2021-06-29 ENCOUNTER — EMERGENCY (EMERGENCY)
Facility: HOSPITAL | Age: 41
LOS: 1 days | Discharge: STILL ADMITTED OR ADMINISTRATIVE | End: 2021-06-29
Admitting: EMERGENCY MEDICINE
Payer: COMMERCIAL

## 2021-06-29 ENCOUNTER — OUTPATIENT (OUTPATIENT)
Dept: INPATIENT UNIT | Facility: HOSPITAL | Age: 41
LOS: 1 days | Discharge: HOME | End: 2021-06-29
Payer: COMMERCIAL

## 2021-06-29 VITALS
DIASTOLIC BLOOD PRESSURE: 60 MMHG | SYSTOLIC BLOOD PRESSURE: 108 MMHG | OXYGEN SATURATION: 99 % | HEIGHT: 62 IN | HEART RATE: 103 BPM | TEMPERATURE: 98 F | RESPIRATION RATE: 17 BRPM

## 2021-06-29 VITALS — SYSTOLIC BLOOD PRESSURE: 100 MMHG | DIASTOLIC BLOOD PRESSURE: 52 MMHG | HEART RATE: 95 BPM

## 2021-06-29 VITALS
HEART RATE: 101 BPM | RESPIRATION RATE: 16 BRPM | SYSTOLIC BLOOD PRESSURE: 104 MMHG | TEMPERATURE: 99 F | DIASTOLIC BLOOD PRESSURE: 69 MMHG

## 2021-06-29 DIAGNOSIS — Z3A.00 WEEKS OF GESTATION OF PREGNANCY NOT SPECIFIED: ICD-10-CM

## 2021-06-29 DIAGNOSIS — O26.899 OTHER SPECIFIED PREGNANCY RELATED CONDITIONS, UNSPECIFIED TRIMESTER: ICD-10-CM

## 2021-06-29 DIAGNOSIS — Z98.890 OTHER SPECIFIED POSTPROCEDURAL STATES: Chronic | ICD-10-CM

## 2021-06-29 LAB
APPEARANCE UR: CLEAR — SIGNIFICANT CHANGE UP
BACTERIA # UR AUTO: ABNORMAL
BILIRUB UR-MCNC: NEGATIVE — SIGNIFICANT CHANGE UP
COLOR SPEC: COLORLESS — SIGNIFICANT CHANGE UP
DIFF PNL FLD: ABNORMAL
EPI CELLS # UR: SIGNIFICANT CHANGE UP
GLUCOSE UR QL: NEGATIVE — SIGNIFICANT CHANGE UP
KETONES UR-MCNC: NEGATIVE — SIGNIFICANT CHANGE UP
LEUKOCYTE ESTERASE UR-ACNC: NEGATIVE — SIGNIFICANT CHANGE UP
NITRITE UR-MCNC: NEGATIVE — SIGNIFICANT CHANGE UP
PH UR: 7.5 — SIGNIFICANT CHANGE UP (ref 5–8)
PROT UR-MCNC: NEGATIVE — SIGNIFICANT CHANGE UP
RBC CASTS # UR COMP ASSIST: SIGNIFICANT CHANGE UP /HPF (ref 0–4)
SP GR SPEC: 1.01 — LOW (ref 1.01–1.02)
UROBILINOGEN FLD QL: SIGNIFICANT CHANGE UP
WBC UR QL: SIGNIFICANT CHANGE UP /HPF (ref 0–5)

## 2021-06-29 PROCEDURE — 76818 FETAL BIOPHYS PROFILE W/NST: CPT | Mod: 26

## 2021-06-29 PROCEDURE — 99205 OFFICE O/P NEW HI 60 MIN: CPT | Mod: 25

## 2021-06-29 PROCEDURE — 76817 TRANSVAGINAL US OBSTETRIC: CPT | Mod: 26

## 2021-06-29 PROCEDURE — L9993: CPT

## 2021-06-29 RX ORDER — OMEGA-3 ACID ETHYL ESTERS 1 G
0 CAPSULE ORAL
Qty: 0 | Refills: 0 | DISCHARGE

## 2021-06-29 RX ORDER — BENZOYL PEROXIDE MICRONIZED 5.8 %
1 TOWELETTE (EA) TOPICAL
Qty: 0 | Refills: 0 | DISCHARGE

## 2021-06-29 NOTE — OB RN TRIAGE NOTE - PMH
<<----- Click to add NO pertinent Past Medical History No pertinent past medical history COVID-19  2/2021 (whole family)

## 2021-06-29 NOTE — OB PROVIDER TRIAGE NOTE - NSHPPHYSICALEXAM_GEN_ALL_CORE
Alert and Oriented x 3  Lung CTAB  Heart RRR  Abdomen gravid soft and nontender    FHT - appropriate for gestational age     Sono:  TVUS- CL 4.58cm- no funneling or dynamic changes noted   TAUS-    BPP 8/8  presentation: transverse   placenta: anterior   YARELY: MVP- 5.11    Speculum Exam:  5ml of dark blood noted in vaginal vault  2cm swollen cyst  with small superficial abrasion noted at 7 o'clock on the posterior labial majora; (Dr. Asif PGY III present at bedside evaluating patient)    small pea size -cervical polyp noted in the cervical os     Vaginal Exam: 0/T/L    Vital Signs Last 24 Hrs  T(C): 37.1 (29 Jun 2021 01:52), Max: 37.1 (29 Jun 2021 01:52)  T(F): 98.8 (29 Jun 2021 01:52), Max: 98.8 (29 Jun 2021 01:52)  HR: 95 (29 Jun 2021 03:49) (95 - 103)  BP: 100/52 (29 Jun 2021 03:49) (94/56 - 108/60)  RR: 16 (29 Jun 2021 01:52) (16 - 17)  SpO2: 99% (29 Jun 2021 01:28) (99% - 99%)

## 2021-06-29 NOTE — ED ADULT TRIAGE NOTE - CHIEF COMPLAINT QUOTE
pt arrives w/ c/o vaginal bleeding. pt states 27 wks pregnant KELLIE 09/29/2021 and states she feels like she needs to make a BM., spoked with L&D pt to be seen in L&D.

## 2021-06-29 NOTE — OB PROVIDER TRIAGE NOTE - NSHPLABSRESULTS_GEN_ALL_CORE
Urinalysis Basic - ( 2021 02:34 )    Color: Colorless / Appearance: Clear / S.009 / pH: x  Gluc: x / Ketone: Negative  / Bili: Negative / Urobili: <2 mg/dL   Blood: x / Protein: Negative / Nitrite: Negative   Leuk Esterase: Negative / RBC: 2-5 /HPF / WBC 0-2 /HPF   Sq Epi: x / Non Sq Epi: Few / Bacteria: Occasional

## 2021-06-29 NOTE — OB PROVIDER TRIAGE NOTE - PMH
COVID-19  2/2021 (whole family)   Anemia, unspecified type    Constipation, unspecified constipation type    COVID-19  2/2021 (whole family)  Hemorrhoids without complication

## 2021-06-29 NOTE — OB RN TRIAGE NOTE - CHIEF COMPLAINT QUOTE
vaginal spotting @ 2000.  I had intercourse Sunday night and was also pushing very hard because I am constipated

## 2021-06-29 NOTE — OB PROVIDER TRIAGE NOTE - ADDITIONAL INSTRUCTIONS
follow up in the office in 1-2 days   pelvic rest- nothing in the vagina  warm compress on perineum  Colace- (Docusate Sodium) 1 tab PO twice a day as needed    S/S of Pre-term labor

## 2021-06-29 NOTE — OB PROVIDER TRIAGE NOTE - NSOBPROVIDERNOTE_OBGYN_ALL_OB_FT
Discussed with Dr. Beaulieu- no evidence of active bleeding, abruption or  labor noted  - ok to send patient home and f/u in office in 2 days

## 2021-06-29 NOTE — OB PROVIDER TRIAGE NOTE - HISTORY OF PRESENT ILLNESS
41 y/o F  @ 26.6 weeks presented to triage with c/o vaginal bleeding since  after intercourse. Pt states that she noticed dark brown blood with increased discharge since . Pt also states that she has been constipated and her Hemorids have been bothering here. She had her  go to the pharmacy to buy her an enema. Pt then reports cramping in abdominal after enema .

## 2021-07-02 ENCOUNTER — APPOINTMENT (OUTPATIENT)
Dept: ANTEPARTUM | Facility: CLINIC | Age: 41
End: 2021-07-02
Payer: COMMERCIAL

## 2021-07-02 ENCOUNTER — ASOB RESULT (OUTPATIENT)
Age: 41
End: 2021-07-02

## 2021-07-02 PROBLEM — U07.1 COVID-19: Chronic | Status: ACTIVE | Noted: 2021-06-29

## 2021-07-02 PROBLEM — K64.9 UNSPECIFIED HEMORRHOIDS: Chronic | Status: ACTIVE | Noted: 2021-06-29

## 2021-07-02 PROBLEM — K59.00 CONSTIPATION, UNSPECIFIED: Chronic | Status: ACTIVE | Noted: 2021-06-29

## 2021-07-02 PROBLEM — D64.9 ANEMIA, UNSPECIFIED: Chronic | Status: ACTIVE | Noted: 2021-06-29

## 2021-07-02 PROCEDURE — 76816 OB US FOLLOW-UP PER FETUS: CPT

## 2021-07-02 PROCEDURE — 76819 FETAL BIOPHYS PROFIL W/O NST: CPT

## 2021-07-02 PROCEDURE — 99072 ADDL SUPL MATRL&STAF TM PHE: CPT

## 2021-08-04 ENCOUNTER — ASOB RESULT (OUTPATIENT)
Age: 41
End: 2021-08-04

## 2021-08-04 ENCOUNTER — APPOINTMENT (OUTPATIENT)
Dept: ANTEPARTUM | Facility: CLINIC | Age: 41
End: 2021-08-04
Payer: COMMERCIAL

## 2021-08-04 PROCEDURE — 76819 FETAL BIOPHYS PROFIL W/O NST: CPT

## 2021-08-04 PROCEDURE — 76816 OB US FOLLOW-UP PER FETUS: CPT

## 2022-01-10 NOTE — ED PROVIDER NOTE - DATE/TIME 1
Pt contacted and informed induction, date, time, location and masking policy  Pt informed may have a light dinner prior to arrival, may be bumped if a medical indicated patient is in need of her slot and we ask that she keep all appts up to induction date  Pt agreed  05-Aug-2019 18:35

## 2023-05-29 NOTE — OB RN PATIENT PROFILE - NS_CIRCUMCISIONPLAN_OBGYN_ALL_OB
You have been prescribed antinausea medicines with your visit here today but the visit where you eloped earlier was reviewed and your testing had been positive for strep, Gardnerella, and trichomonas so treatment will be initiated for that as well since you have returned.    Gonorrhea and Chlamydia were negative.      
No

## 2024-07-24 NOTE — OB RN PATIENT PROFILE - THE IMPORTANCE OF THE CORRECT PLACEMENT OF THE INFANT AND THE PARENT’S ABILITY TO ASSESS THE INFANT'S SKIN COLOR WHILE PLACED ON SKIN TO SKIN HAS BEEN REINFORCED.
Render In Strict Bullet Format?: No Detail Level: Zone Initiate Treatment: Tretinoin Statement Selected